# Patient Record
(demographics unavailable — no encounter records)

---

## 2025-07-02 NOTE — HISTORY OF PRESENT ILLNESS
[FreeTextEntry1] : CC: Vag/vulva itch and discharge. C/o periods heavy and painful.  LMP:2025 HPI: H/o heavy periods, lasting 2-3d, pelvic sono 2024 fibroids, largest 5.3cm   POBGYN: G 2   x 2 H/O Fibroids   PAP: 2024 LGSIL, HPV+ Colposcopy 2024 LGSIL  Mammo: 2025 Birad 2 Colonoscopy:

## 2025-07-02 NOTE — DISCUSSION/SUMMARY
[FreeTextEntry1] : Vaginitis: Rx FLuconnazole and Lotrisone cream Heavy painful periods, fibroids- PT had anemia and was treated. Options of management d/w pt. PT agreed to Mirena iud Will order. f/u 2wks for repeat pap/hpv

## 2025-07-02 NOTE — PHYSICAL EXAM
[MA] : MA [Appropriately responsive] : appropriately responsive [Alert] : alert [No Acute Distress] : no acute distress [No Lymphadenopathy] : no lymphadenopathy [Soft] : soft [Non-tender] : non-tender [Non-distended] : non-distended [Oriented x3] : oriented x3 [Vulvitis] : vulvitis [Erythematous] : erythema [Discharge] : a  ~M vaginal discharge was present [Normal] : normal [Enlarged ___ wks] : enlarged [unfilled] ~Uweeks [Uterine Adnexae] : normal [FreeTextEntry2] : Jason

## 2025-07-16 NOTE — PHYSICAL EXAM
[MA] : MA [FreeTextEntry2] : Jason [Appropriately responsive] : appropriately responsive [Alert] : alert [No Acute Distress] : no acute distress [Soft] : soft [Non-tender] : non-tender [Non-distended] : non-distended [Labia Majora] : normal [Labia Minora] : normal [Normal] : normal

## 2025-07-16 NOTE — HISTORY OF PRESENT ILLNESS
[FreeTextEntry1] : CC: GYN F/U. PT s/p tx for vaginits, sx resolved. LMP: 2025   POBGYN: G 2   x 2 H/O Fibroids  PAP: 2024 LGSIL, HPV+ Colposcopy 2024 LGSIL  Mammo: 2025 Birad 2 Colonoscopy:

## 2025-07-30 NOTE — PROCEDURE
[Locate IUD] : locate IUD [Transvaginal Ultrasound] : transvaginal ultrasound [IUD Placement] : intrauterine device (IUD) placement [Consent Obtained] : Consent obtained [Abnormal Uterine Bleeding] : abnormal uterine bleeding [Risks] : risks [Patient] : patient [No Premedication] : No premedication [Tenaculum] : Tenaculum [Easy Passage] : Easy passage [Sounded to ___ cm] : sounded to [unfilled] ~Ucm [Mirena IUD] : Mirena IUD [Tolerated Well] : Patient tolerated the procedure well [No Complications] : No complications [None] : None

## 2025-07-30 NOTE — HISTORY OF PRESENT ILLNESS
[FreeTextEntry1] : CC: Here for Mirena IUD insertion. Indication: heavy periods, fibroids LMP: 2025   POBGYN: G 2   x 2 H/O Fibroids  PAP: 2024 LGSIL, HPV+ Colposcopy 2024 LGSIL  Mammo: 2025 Birad 2 Colonoscopy: